# Patient Record
Sex: FEMALE | Race: WHITE | NOT HISPANIC OR LATINO
[De-identification: names, ages, dates, MRNs, and addresses within clinical notes are randomized per-mention and may not be internally consistent; named-entity substitution may affect disease eponyms.]

---

## 2023-12-11 ENCOUNTER — NON-APPOINTMENT (OUTPATIENT)
Age: 73
End: 2023-12-11

## 2024-02-05 ENCOUNTER — APPOINTMENT (OUTPATIENT)
Dept: GASTROENTEROLOGY | Facility: CLINIC | Age: 74
End: 2024-02-05
Payer: MEDICARE

## 2024-02-05 VITALS — WEIGHT: 239 LBS

## 2024-02-05 DIAGNOSIS — K90.0 CELIAC DISEASE: ICD-10-CM

## 2024-02-05 PROBLEM — Z00.00 ENCOUNTER FOR PREVENTIVE HEALTH EXAMINATION: Status: ACTIVE | Noted: 2024-02-05

## 2024-02-05 PROCEDURE — 97802 MEDICAL NUTRITION INDIV IN: CPT

## 2024-02-10 ENCOUNTER — NON-APPOINTMENT (OUTPATIENT)
Age: 74
End: 2024-02-10

## 2024-02-14 ENCOUNTER — TRANSCRIPTION ENCOUNTER (OUTPATIENT)
Age: 74
End: 2024-02-14

## 2024-02-14 ENCOUNTER — APPOINTMENT (OUTPATIENT)
Dept: CARDIOLOGY | Facility: CLINIC | Age: 74
End: 2024-02-14
Payer: MEDICARE

## 2024-02-14 VITALS
BODY MASS INDEX: 38.09 KG/M2 | HEART RATE: 96 BPM | SYSTOLIC BLOOD PRESSURE: 150 MMHG | OXYGEN SATURATION: 98 % | DIASTOLIC BLOOD PRESSURE: 84 MMHG | WEIGHT: 237 LBS | HEIGHT: 66 IN

## 2024-02-14 VITALS — DIASTOLIC BLOOD PRESSURE: 72 MMHG | SYSTOLIC BLOOD PRESSURE: 122 MMHG

## 2024-02-14 DIAGNOSIS — K90.0 CELIAC DISEASE: ICD-10-CM

## 2024-02-14 DIAGNOSIS — Z78.9 OTHER SPECIFIED HEALTH STATUS: ICD-10-CM

## 2024-02-14 DIAGNOSIS — Z82.49 FAMILY HISTORY OF ISCHEMIC HEART DISEASE AND OTHER DISEASES OF THE CIRCULATORY SYSTEM: ICD-10-CM

## 2024-02-14 DIAGNOSIS — Z63.4 DISAPPEARANCE AND DEATH OF FAMILY MEMBER: ICD-10-CM

## 2024-02-14 DIAGNOSIS — Z80.3 FAMILY HISTORY OF MALIGNANT NEOPLASM OF BREAST: ICD-10-CM

## 2024-02-14 PROCEDURE — 99204 OFFICE O/P NEW MOD 45 MIN: CPT

## 2024-02-14 PROCEDURE — 93000 ELECTROCARDIOGRAM COMPLETE: CPT | Mod: NC

## 2024-02-14 RX ORDER — PANTOPRAZOLE 40 MG/1
40 TABLET, DELAYED RELEASE ORAL DAILY
Refills: 0 | Status: ACTIVE | COMMUNITY

## 2024-02-14 RX ORDER — LEVOTHYROXINE SODIUM 150 UG/1
150 TABLET ORAL DAILY
Refills: 0 | Status: ACTIVE | COMMUNITY

## 2024-02-14 RX ORDER — ALBUTEROL SULFATE 90 UG/1
108 (90 BASE) AEROSOL, METERED RESPIRATORY (INHALATION)
Refills: 0 | Status: ACTIVE | COMMUNITY

## 2024-02-14 SDOH — SOCIAL STABILITY - SOCIAL INSECURITY: DISSAPEARANCE AND DEATH OF FAMILY MEMBER: Z63.4

## 2024-02-16 ENCOUNTER — APPOINTMENT (OUTPATIENT)
Dept: CARDIOLOGY | Facility: CLINIC | Age: 74
End: 2024-02-16
Payer: MEDICARE

## 2024-02-16 PROCEDURE — 93015 CV STRESS TEST SUPVJ I&R: CPT

## 2024-02-20 ENCOUNTER — APPOINTMENT (OUTPATIENT)
Dept: CARDIOLOGY | Facility: CLINIC | Age: 74
End: 2024-02-20
Payer: MEDICARE

## 2024-02-20 DIAGNOSIS — E66.9 OBESITY, UNSPECIFIED: ICD-10-CM

## 2024-02-20 DIAGNOSIS — Z01.818 ENCOUNTER FOR OTHER PREPROCEDURAL EXAMINATION: ICD-10-CM

## 2024-02-20 DIAGNOSIS — E78.6 LIPOPROTEIN DEFICIENCY: ICD-10-CM

## 2024-02-20 DIAGNOSIS — R60.9 EDEMA, UNSPECIFIED: ICD-10-CM

## 2024-02-20 DIAGNOSIS — R94.31 ABNORMAL ELECTROCARDIOGRAM [ECG] [EKG]: ICD-10-CM

## 2024-02-20 PROCEDURE — 99441: CPT | Mod: 93

## 2024-02-20 NOTE — HISTORY OF PRESENT ILLNESS
[FreeTextEntry1] : The patient is a 73-year-old white female with a past medical history remarkable for a family history of premature coronary artery disease, obesity, hypothyroidism, and celiac disease who presents for evaluation of an abnormal ECG prior to the D&C. Laboratory results, echocardiographic results, and carotid ultrasound results were requested from your office. The patient does not exercise.  She reports peripheral edema that develops during the day.  She reports that a vascular evaluation demonstrated venous insufficiency.

## 2024-02-20 NOTE — HISTORY OF PRESENT ILLNESS
[FreeTextEntry1] : The patient is a 73-year-old white female with a past medical history remarkable for a family history of premature coronary artery disease, obesity, hypothyroidism, and celiac disease who presented for evaluation of an abnormal ECG prior to a D&C. Laboratory results, echocardiographic results, and carotid ultrasound results were obtained from your office.  An exercise stress test was performed on February 16.  It was normal and demonstrated a below average exercise tolerance.   The patient does not exercise.  She reports peripheral edema that develops during the day.  She reports that a vascular evaluation demonstrated venous insufficiency. I reviewed the results with the patient.  A plan was developed.  Her questions were answered.  We spoke on the phone for 5 minutes.  The patient consented to a telephone telemedicine visit

## 2024-02-20 NOTE — CARDIOLOGY SUMMARY
[de-identified] : Normal sinus rhythm, RSR prime, increased RS ratio V2, poor R wave progression, Q wave lead II, left axis deviation [de-identified] : - Abnormal ECG -EXERCISE STRESS TEST: 2/16/2024, normal, below average exercise tolerance, [facility] -ECHOCARDIOGRAM: 5/11/2021, outside office, reported EF 56%, trace mitral regurgitation and tricuspid regurgitation -CAROTID ULTRASOUND: 5/11/2021, outside office, reported normal - Family history of premature CAD: Father MI 52 - Obesity: BMI 38 - Celiac disease/hypothyroidism

## 2024-02-20 NOTE — PHYSICAL EXAM

## 2024-02-20 NOTE — DISCUSSION/SUMMARY
[FreeTextEntry1] : Preoperative cardiovascular evaluation: D&C In view of the patient's normal ejection fraction, normal stress test, adequate functional capacity, and the absence of unstable angina, congestive heart failure, or severe aortic stenosis, she is at average risk for perioperative cardiovascular morbidity and mortality, and cleared from a cardiology standpoint with the following recommendations: Perioperative beta-blockers, endocarditis prophylaxis, and a postoperative monitored bed are not indicated. DVT prophylaxis at the surgeon's discretion.  Low HDL cholesterol 10-year cardiac risk 9% 10/3/2023. We rediscussed diet, exercise, and weight loss If the patient's 10-year cardiac risk remains elevated despite behavioral modification, we will consider statin therapy  Family history of premature coronary artery disease Status post normal exercise stress test 2/16/2024   Edema Probably secondary to the patient's obesity in view of her laboratory and echocardiographic results   Obesity BMI 38 Diet, exercise, and weight loss as discussed above  RTO 1 year

## 2024-02-20 NOTE — REASON FOR VISIT
[FreeTextEntry1] : Telephone telemedicine visit   Chief Complaint: Pre operative cardiovascular valuation

## 2024-02-20 NOTE — DISCUSSION/SUMMARY
[EKG obtained to assist in diagnosis and management of assessed problem(s)] : EKG obtained to assist in diagnosis and management of assessed problem(s) [FreeTextEntry1] : Preoperative cardiovascular evaluation: D&C Laboratory results requested from your office. Echo and carotid ultrasound results requested from your office. An exercise stress test was ordered to rule out myocardial ischemia as the etiology of her abnormal ECG.  Family history of premature coronary artery disease If the patient's 10-year cardiac risk is greater than 5%, we will discuss statin therapy  Edema As noted above, laboratory results and echo results requested from your office  Obesity BMI 38 We discussed diet and weight loss. If the patient's stress test is unremarkable, she will initiate an exercise program  TTM

## 2024-02-20 NOTE — CARDIOLOGY SUMMARY
[de-identified] : - Abnormal ECG -EXERCISE STRESS TEST: 2/16/2024, normal, below average exercise tolerance, [facility] -ECHOCARDIOGRAM: 5/11/2021, outside office, reported EF 56%, trace mitral regurgitation and tricuspid regurgitation -CAROTID ULTRASOUND: 5/11/2021, outside office, reported normal - Family history of premature CAD: Father MI 52 - Obesity: BMI 38 - Celiac disease/hypothyroidism - Low HDL cholesterol: 10-year cardiac risk 9%, 10/3/2023